# Patient Record
Sex: MALE | Race: WHITE | URBAN - METROPOLITAN AREA
[De-identification: names, ages, dates, MRNs, and addresses within clinical notes are randomized per-mention and may not be internally consistent; named-entity substitution may affect disease eponyms.]

---

## 2018-05-19 ENCOUNTER — OFFICE VISIT (OUTPATIENT)
Dept: FAMILY MEDICINE CLINIC | Facility: CLINIC | Age: 33
End: 2018-05-19

## 2018-05-19 VITALS
SYSTOLIC BLOOD PRESSURE: 110 MMHG | RESPIRATION RATE: 18 BRPM | WEIGHT: 176 LBS | BODY MASS INDEX: 24.1 KG/M2 | HEIGHT: 71.5 IN | OXYGEN SATURATION: 98 % | DIASTOLIC BLOOD PRESSURE: 70 MMHG | TEMPERATURE: 98 F | HEART RATE: 81 BPM

## 2018-05-19 DIAGNOSIS — J30.2 SEASONAL ALLERGIC RHINITIS, UNSPECIFIED TRIGGER: Primary | ICD-10-CM

## 2018-05-19 DIAGNOSIS — J02.9 SORE THROAT: ICD-10-CM

## 2018-05-19 PROCEDURE — 87880 STREP A ASSAY W/OPTIC: CPT | Performed by: NURSE PRACTITIONER

## 2018-05-19 PROCEDURE — 99213 OFFICE O/P EST LOW 20 MIN: CPT | Performed by: NURSE PRACTITIONER

## 2018-05-19 NOTE — PROGRESS NOTES
CHIEF COMPLAINT:   Patient presents with:  Sore Throat: ear pain, congestion, sinus pressure x 2 days      HPI:   Tom Grande is a 29year old male presents to clinic with symptoms of sore throat. Patient has had for 2 days.  Symptoms have been increasi CARDIO: RRR without murmur  GI: good BS's,no masses, hepatosplenomegaly, or tenderness on direct palpation  EXTREMITIES: no cyanosis, clubbing or edema  LYMPH: no anterior cervical. no submandibular lymphadenopathy.   No posterior cervical or occipital lymp Symptoms include a drippy, stuffy, and itchy nose. They also include sneezing and red and itchy eyes. You may feel tired more often.  Severe allergies may also affect your breathing and trigger a condition called asthma.   Tests can be done to see what dawson Call your healthcare provider right away if the following occur:  · Coughing or wheezing  · Fever of 100.4°F (38°C) or higher, or as directed by your healthcare provider  · Raised red bumps (hives)  · Continuing symptoms, new symptoms, or worsening symptom

## 2018-05-19 NOTE — PATIENT INSTRUCTIONS
Strep is negative  Use Claritin 10mg daily for sinus congestion  Ibuprofen as needed for pain. Allergic Rhinitis  Allergic rhinitis is an allergic reaction that affects the nose, and often the eyes. It’s often known as nasal allergies.  Nasal allergies ar · Keep humidity low by using a dehumidifier or air conditioner. Keep the dehumidifier and air conditioner clean and free of mold. · Clean moldy areas with bleach and water. In general:  · Vacuum once or twice a week.  If possible, use a vacuum with a high

## 2019-05-07 ENCOUNTER — OFFICE VISIT (OUTPATIENT)
Dept: FAMILY MEDICINE CLINIC | Facility: CLINIC | Age: 34
End: 2019-05-07
Payer: COMMERCIAL

## 2019-05-07 VITALS
HEIGHT: 71.5 IN | HEART RATE: 104 BPM | RESPIRATION RATE: 18 BRPM | WEIGHT: 176 LBS | DIASTOLIC BLOOD PRESSURE: 80 MMHG | TEMPERATURE: 99 F | BODY MASS INDEX: 24.1 KG/M2 | SYSTOLIC BLOOD PRESSURE: 110 MMHG | OXYGEN SATURATION: 98 %

## 2019-05-07 DIAGNOSIS — J01.00 ACUTE NON-RECURRENT MAXILLARY SINUSITIS: Primary | ICD-10-CM

## 2019-05-07 DIAGNOSIS — J30.89 SEASONAL ALLERGIC RHINITIS DUE TO OTHER ALLERGIC TRIGGER: ICD-10-CM

## 2019-05-07 DIAGNOSIS — R05.9 COUGH: ICD-10-CM

## 2019-05-07 PROCEDURE — 99213 OFFICE O/P EST LOW 20 MIN: CPT | Performed by: PHYSICIAN ASSISTANT

## 2019-05-07 RX ORDER — PREDNISONE 20 MG/1
20 TABLET ORAL 2 TIMES DAILY
Qty: 6 TABLET | Refills: 0 | Status: SHIPPED | OUTPATIENT
Start: 2019-05-07 | End: 2019-05-10

## 2019-05-07 RX ORDER — FLUTICASONE PROPIONATE 50 MCG
2 SPRAY, SUSPENSION (ML) NASAL DAILY
Qty: 1 BOTTLE | Refills: 1 | Status: SHIPPED | OUTPATIENT
Start: 2019-05-07 | End: 2020-05-01

## 2019-05-07 RX ORDER — BENZONATATE 200 MG/1
200 CAPSULE ORAL 3 TIMES DAILY PRN
Qty: 30 CAPSULE | Refills: 0 | Status: SHIPPED | OUTPATIENT
Start: 2019-05-07 | End: 2020-11-27 | Stop reason: ALTCHOICE

## 2019-05-07 RX ORDER — AMOXICILLIN AND CLAVULANATE POTASSIUM 875; 125 MG/1; MG/1
1 TABLET, FILM COATED ORAL 2 TIMES DAILY
Qty: 20 TABLET | Refills: 0 | Status: SHIPPED | OUTPATIENT
Start: 2019-05-07 | End: 2019-05-17

## 2019-05-07 NOTE — PATIENT INSTRUCTIONS
1. Augmentin 875 mg twice daily for 10 days. Recommend probiotics while on this medication to prevent antibiotics associated diarrhea or yeast infections.   Examples include yogurt with active live cultures; or in capsule/granule forms such as Florastor,

## 2019-05-07 NOTE — PROGRESS NOTES
CHIEF COMPLAINT:   Patient presents with:  Cough: congestion, sob x 3 weeks       HPI:   Shabnam Crockett is a 36year old male who presents for upper respiratory symptoms for  3 weeks.  Patient reports nasal congestion, sinus pressure, post nasal drainage, palpation of maxillary sinuses  EYES: conjunctiva clear, EOM intact  EARS: TM's clear bilaterally  NOSE: Nostrils patent, clear nasal discharge, nasal mucosa erythematous/edematous   THROAT: Oral mucosa pink, moist. Posterior pharynx is moderatly injected mg twice daily for 10 days. Recommend probiotics while on this medication to prevent antibiotics associated diarrhea or yeast infections.   Examples include yogurt with active live cultures; or in capsule/granule forms such as Florastor, Culturelle, Aligne

## 2020-01-16 ENCOUNTER — MED REC SCAN ONLY (OUTPATIENT)
Dept: FAMILY MEDICINE CLINIC | Facility: CLINIC | Age: 35
End: 2020-01-16

## 2020-02-27 ENCOUNTER — MED REC SCAN ONLY (OUTPATIENT)
Dept: FAMILY MEDICINE CLINIC | Facility: CLINIC | Age: 35
End: 2020-02-27

## 2020-11-27 ENCOUNTER — OFFICE VISIT (OUTPATIENT)
Dept: FAMILY MEDICINE CLINIC | Facility: CLINIC | Age: 35
End: 2020-11-27
Payer: COMMERCIAL

## 2020-11-27 VITALS
DIASTOLIC BLOOD PRESSURE: 84 MMHG | OXYGEN SATURATION: 96 % | TEMPERATURE: 97 F | WEIGHT: 175 LBS | RESPIRATION RATE: 16 BRPM | HEART RATE: 108 BPM | SYSTOLIC BLOOD PRESSURE: 121 MMHG | HEIGHT: 72 IN | BODY MASS INDEX: 23.7 KG/M2

## 2020-11-27 DIAGNOSIS — Z20.822 SUSPECTED COVID-19 VIRUS INFECTION: Primary | ICD-10-CM

## 2020-11-27 PROCEDURE — 3074F SYST BP LT 130 MM HG: CPT | Performed by: PHYSICIAN ASSISTANT

## 2020-11-27 PROCEDURE — 3079F DIAST BP 80-89 MM HG: CPT | Performed by: PHYSICIAN ASSISTANT

## 2020-11-27 PROCEDURE — 99213 OFFICE O/P EST LOW 20 MIN: CPT | Performed by: PHYSICIAN ASSISTANT

## 2020-11-27 PROCEDURE — 99072 ADDL SUPL MATRL&STAF TM PHE: CPT | Performed by: PHYSICIAN ASSISTANT

## 2020-11-27 PROCEDURE — 3008F BODY MASS INDEX DOCD: CPT | Performed by: PHYSICIAN ASSISTANT

## 2020-11-27 NOTE — PROGRESS NOTES
CHIEF COMPLAINT:     Patient presents with:  Diarrhea: body aches/congestion/fatigue/chills x 2 days. no fever      HPI:   George Grande is a 30year old male who presents with bodyache, fatigue, headache, runny nose for 2 days.   Has had a few loose stoo Diarrhea (body aches/congestion/fatigue/chills x 2 days. no fever). Symptoms are consistent with:      ASSESSMENT:  Suspected covid-19 virus infection  (primary encounter diagnosis)      PLAN: Sars CoV2 PCR. quest      Symptomatic care:   1.  Rest. Drink pl

## 2020-11-27 NOTE — PATIENT INSTRUCTIONS
Coronavirus Disease 2019 (COVID-19)     [place] [address] is committed to the safety and well-being of our patients, members, employees, and communities.  As concerns arise about the new strain of coronavirus that causes COVID-19, [place] [address] 4. If you have a medical appointment, call the healthcare provider ahead of time and tell them that you have or may have COVID-19.  5. For medical emergencies, call 911 and notify the dispatch personnel that you have or may have COVID-19.   6. Cover your c · At least 10 days have passed since symptoms first appeared OR if asymptomatic patient or date of symptom onset is unclear then use 10 days post COVID test date.    · At least 20 days have passed for severe illness (requiring hospitalization) OR if you are *Some people will be required to have a repeat COVID-19 test in order to be eligible to donate. If you’re instructed by Maria Antonia Valdez that a repeat test is required, please contact the [facility] COVID-19 Nurse Triage Line at [phone].     Additional Inf

## 2021-09-07 ENCOUNTER — OFFICE VISIT (OUTPATIENT)
Dept: FAMILY MEDICINE CLINIC | Facility: CLINIC | Age: 36
End: 2021-09-07
Payer: COMMERCIAL

## 2021-09-07 VITALS
HEIGHT: 72 IN | WEIGHT: 184 LBS | BODY MASS INDEX: 24.92 KG/M2 | DIASTOLIC BLOOD PRESSURE: 72 MMHG | HEART RATE: 79 BPM | SYSTOLIC BLOOD PRESSURE: 122 MMHG | TEMPERATURE: 98 F | OXYGEN SATURATION: 98 % | RESPIRATION RATE: 16 BRPM

## 2021-09-07 DIAGNOSIS — J06.9 UPPER RESPIRATORY TRACT INFECTION, UNSPECIFIED TYPE: Primary | ICD-10-CM

## 2021-09-07 PROCEDURE — 3078F DIAST BP <80 MM HG: CPT | Performed by: PHYSICIAN ASSISTANT

## 2021-09-07 PROCEDURE — 99213 OFFICE O/P EST LOW 20 MIN: CPT | Performed by: PHYSICIAN ASSISTANT

## 2021-09-07 PROCEDURE — 3074F SYST BP LT 130 MM HG: CPT | Performed by: PHYSICIAN ASSISTANT

## 2021-09-07 PROCEDURE — 3008F BODY MASS INDEX DOCD: CPT | Performed by: PHYSICIAN ASSISTANT

## 2021-09-07 RX ORDER — BENZONATATE 200 MG/1
200 CAPSULE ORAL 3 TIMES DAILY PRN
Qty: 30 CAPSULE | Refills: 0 | Status: SHIPPED | OUTPATIENT
Start: 2021-09-07

## 2021-09-07 RX ORDER — FLUTICASONE PROPIONATE 50 MCG
SPRAY, SUSPENSION (ML) NASAL
Qty: 1 EACH | Refills: 0 | Status: SHIPPED | OUTPATIENT
Start: 2021-09-07

## 2021-09-07 NOTE — PATIENT INSTRUCTIONS
Coronavirus Disease 2019 (COVID-19)     [facility] [facility] is committed to the safety and well-being of our patients, members, employees, and communities.  As concerns arise about the new strain of coronavirus that causes COVID-19, [address] exposure  • After day 7 from date of last exposure with a negative test result (test must occur on day 5 or later)  After stopping quarantine, you should  • Watch for symptoms until 14 days after exposure.   • If you have symptoms, immediately self-isolate Care     If you are awaiting test results or are confirmed positive for COVID -19, and your symptoms worsen at home with symptoms such as: extreme weakness, difficult breathing, or unrelenting fevers greater than 100.4 degrees Fahrenheit, you should contac Follow-up  If you are diagnosed with COVID, refrain from exercise until approved by your primary care provider. Please call your primary care provider within 2 days of your discharge to arrange for a telehealth follow-up.  CDC does not recommend repeat test Control & Prevention (CDC)  10 things you can do to manage your health at home, [email or web address]. pdf  [email or web address] Retrieved March 17, 2021, from [email or web address]-[email or web address]-long-haulers. html  Long-term effects of covid-19. (n.d.).  Retrieved May 11, 2021, from [email or web address] (water, soft drinks, juices, tea, or soup). Extra fluids will help loosen secretions in the nose and lungs.   · Over-the-counter cold medicines will not shorten the length of time you’re sick, but they may be helpful for the following symptoms: cough, sore

## 2021-09-07 NOTE — PROGRESS NOTES
CHIEF COMPLAINT:     Patient presents with:  Upper Respiratory Infection      HPI:   Sahil Olivo is a 29year old male who presents with cough, congestion, headache for 4 days.        Associated symptoms:    Fever/Chills  No  Sore throat  No  Cough  Yes diagnosis)      PLAN: Covid Test alinity pcr obtained. Symptomatic care:   1. Rest. Drink plenty of fluids. 2. Tylenol or ibuprofen for discomfort or fever.    3. OTC decongestant (phenylephrine) expectorants (guaifenesin), nasal steroid sprays  (flut

## 2021-09-09 LAB — SARS-COV-2 RNA RESP QL NAA+PROBE: NOT DETECTED

## 2023-02-06 ENCOUNTER — TELEPHONE (OUTPATIENT)
Dept: FAMILY MEDICINE CLINIC | Facility: CLINIC | Age: 38
End: 2023-02-06

## 2023-02-06 NOTE — TELEPHONE ENCOUNTER
PT HAS SCHEDULED MYCHART APT FOR COMP PX- PT HAS NOT BEEN SEEN SINCE 4/28/16. WILL DR STILL ACCEPT?     Future Appointments   Date Time Provider Theresa Workman   2/14/2023  2:00 PM Duane Burt MD [facility] BABAK Quinonez         PLEASE ADV    [facility] YOU

## 2023-02-14 ENCOUNTER — OFFICE VISIT (OUTPATIENT)
Dept: FAMILY MEDICINE CLINIC | Facility: CLINIC | Age: 38
End: 2023-02-14
Payer: COMMERCIAL

## 2023-02-14 VITALS
HEIGHT: 71.5 IN | SYSTOLIC BLOOD PRESSURE: 120 MMHG | HEART RATE: 77 BPM | WEIGHT: 169 LBS | OXYGEN SATURATION: 99 % | BODY MASS INDEX: 23.14 KG/M2 | TEMPERATURE: 97 F | RESPIRATION RATE: 16 BRPM | DIASTOLIC BLOOD PRESSURE: 80 MMHG

## 2023-02-14 DIAGNOSIS — Z30.09 VASECTOMY EVALUATION: ICD-10-CM

## 2023-02-14 DIAGNOSIS — Z00.00 WELL ADULT EXAM: Primary | ICD-10-CM

## 2023-02-14 DIAGNOSIS — R13.10 DYSPHAGIA, UNSPECIFIED TYPE: ICD-10-CM

## 2023-02-14 DIAGNOSIS — R53.83 OTHER FATIGUE: ICD-10-CM

## 2023-02-14 DIAGNOSIS — N52.9 ERECTILE DYSFUNCTION, UNSPECIFIED ERECTILE DYSFUNCTION TYPE: ICD-10-CM

## 2023-02-14 PROCEDURE — 99385 PREV VISIT NEW AGE 18-39: CPT | Performed by: FAMILY MEDICINE

## 2023-02-14 PROCEDURE — 3008F BODY MASS INDEX DOCD: CPT | Performed by: FAMILY MEDICINE

## 2023-02-14 PROCEDURE — 3074F SYST BP LT 130 MM HG: CPT | Performed by: FAMILY MEDICINE

## 2023-02-14 PROCEDURE — 3079F DIAST BP 80-89 MM HG: CPT | Performed by: FAMILY MEDICINE

## 2023-02-15 ENCOUNTER — NURSE ONLY (OUTPATIENT)
Dept: FAMILY MEDICINE CLINIC | Facility: CLINIC | Age: 38
End: 2023-02-15
Payer: COMMERCIAL

## 2023-02-15 DIAGNOSIS — R53.83 OTHER FATIGUE: ICD-10-CM

## 2023-02-15 DIAGNOSIS — Z00.00 WELL ADULT EXAM: ICD-10-CM

## 2023-02-15 DIAGNOSIS — N52.9 ERECTILE DYSFUNCTION, UNSPECIFIED ERECTILE DYSFUNCTION TYPE: ICD-10-CM

## 2023-02-15 LAB
ALBUMIN SERPL-MCNC: 4.3 G/DL (ref 3.4–5)
ALBUMIN/GLOB SERPL: 1.3 {RATIO} (ref 1–2)
ALP LIVER SERPL-CCNC: 76 U/L
ALT SERPL-CCNC: 32 U/L
ANION GAP SERPL CALC-SCNC: 0 MMOL/L (ref 0–18)
AST SERPL-CCNC: 19 U/L (ref 15–37)
BASOPHILS # BLD AUTO: 0.03 X10(3) UL (ref 0–0.2)
BASOPHILS NFR BLD AUTO: 0.6 %
BILIRUB SERPL-MCNC: 0.5 MG/DL (ref 0.1–2)
BUN BLD-MCNC: 10 MG/DL (ref 7–18)
CALCIUM BLD-MCNC: 9.5 MG/DL (ref 8.5–10.1)
CHLORIDE SERPL-SCNC: 106 MMOL/L (ref 98–112)
CHOLEST SERPL-MCNC: 165 MG/DL (ref ?–200)
CO2 SERPL-SCNC: 31 MMOL/L (ref 21–32)
CREAT BLD-MCNC: 0.86 MG/DL
EOSINOPHIL # BLD AUTO: 0.26 X10(3) UL (ref 0–0.7)
EOSINOPHIL NFR BLD AUTO: 5 %
ERYTHROCYTE [DISTWIDTH] IN BLOOD BY AUTOMATED COUNT: 11.8 %
FASTING PATIENT LIPID ANSWER: YES
FASTING STATUS PATIENT QL REPORTED: YES
GFR SERPLBLD BASED ON 1.73 SQ M-ARVRAT: 115 ML/MIN/1.73M2 (ref 60–?)
GLOBULIN PLAS-MCNC: 3.4 G/DL (ref 2.8–4.4)
GLUCOSE BLD-MCNC: 102 MG/DL (ref 70–99)
HCT VFR BLD AUTO: 43.8 %
HDLC SERPL-MCNC: 52 MG/DL (ref 40–59)
HGB BLD-MCNC: 15.1 G/DL
IMM GRANULOCYTES # BLD AUTO: 0 X10(3) UL (ref 0–1)
IMM GRANULOCYTES NFR BLD: 0 %
LDLC SERPL CALC-MCNC: 102 MG/DL (ref ?–100)
LYMPHOCYTES # BLD AUTO: 1.93 X10(3) UL (ref 1–4)
LYMPHOCYTES NFR BLD AUTO: 37.2 %
MCH RBC QN AUTO: 31 PG (ref 26–34)
MCHC RBC AUTO-ENTMCNC: 34.5 G/DL (ref 31–37)
MCV RBC AUTO: 89.9 FL
MONOCYTES # BLD AUTO: 0.29 X10(3) UL (ref 0.1–1)
MONOCYTES NFR BLD AUTO: 5.6 %
NEUTROPHILS # BLD AUTO: 2.68 X10 (3) UL (ref 1.5–7.7)
NEUTROPHILS # BLD AUTO: 2.68 X10(3) UL (ref 1.5–7.7)
NEUTROPHILS NFR BLD AUTO: 51.6 %
NONHDLC SERPL-MCNC: 113 MG/DL (ref ?–130)
OSMOLALITY SERPL CALC.SUM OF ELEC: 283 MOSM/KG (ref 275–295)
PLATELET # BLD AUTO: 326 10(3)UL (ref 150–450)
POTASSIUM SERPL-SCNC: 4.2 MMOL/L (ref 3.5–5.1)
PROT SERPL-MCNC: 7.7 G/DL (ref 6.4–8.2)
RBC # BLD AUTO: 4.87 X10(6)UL
SODIUM SERPL-SCNC: 137 MMOL/L (ref 136–145)
T4 FREE SERPL-MCNC: 1 NG/DL (ref 0.8–1.7)
TRIGL SERPL-MCNC: 52 MG/DL (ref 30–149)
TSI SER-ACNC: 1.67 MIU/ML (ref 0.36–3.74)
VLDLC SERPL CALC-MCNC: 9 MG/DL (ref 0–30)
WBC # BLD AUTO: 5.2 X10(3) UL (ref 4–11)

## 2023-02-15 PROCEDURE — 84402 ASSAY OF FREE TESTOSTERONE: CPT | Performed by: FAMILY MEDICINE

## 2023-02-15 PROCEDURE — 80061 LIPID PANEL: CPT | Performed by: FAMILY MEDICINE

## 2023-02-15 PROCEDURE — 80050 GENERAL HEALTH PANEL: CPT | Performed by: FAMILY MEDICINE

## 2023-02-15 PROCEDURE — 84403 ASSAY OF TOTAL TESTOSTERONE: CPT | Performed by: FAMILY MEDICINE

## 2023-02-15 PROCEDURE — 84439 ASSAY OF FREE THYROXINE: CPT | Performed by: FAMILY MEDICINE

## 2023-02-15 NOTE — PROGRESS NOTES
Kika Diaz present in office for nurse visit. Labs as ordered by Dr. Humphrey Pi; 22 g, Left AC, lavender tube, green tube and gold tube     All questions/concerns addressed. Patient left in stable condition.

## 2023-02-25 LAB
TESTOSTERONE TOTAL: 660.7 NG/DL
TESTOSTERONE, FREE -MS/MS: 129.7 PG/ML

## 2023-06-20 ENCOUNTER — OFFICE VISIT (OUTPATIENT)
Dept: SURGERY | Facility: CLINIC | Age: 38
End: 2023-06-20

## 2023-06-20 ENCOUNTER — PATIENT MESSAGE (OUTPATIENT)
Dept: SURGERY | Facility: CLINIC | Age: 38
End: 2023-06-20

## 2023-06-20 DIAGNOSIS — Z30.2 ENCOUNTER FOR STERILIZATION: Primary | ICD-10-CM

## 2023-06-20 PROCEDURE — 99243 OFF/OP CNSLTJ NEW/EST LOW 30: CPT | Performed by: SURGERY

## 2023-06-20 RX ORDER — DIAZEPAM 10 MG/1
10 TABLET ORAL SEE ADMIN INSTRUCTIONS
Qty: 1 TABLET | Refills: 0 | Status: SHIPPED | OUTPATIENT
Start: 2023-06-20

## 2023-06-20 NOTE — PROGRESS NOTES
Urology Clinic Note - New Patient    Referring Provider:  No referring provider defined for this encounter. Primary Care Provider:  Will Kitchen MD     Chief Complaint:   Referral for vasectomy    HPI:   Nayeli Calderón is a 41year old male with no PMH referred for interest in male sterility via vasectomy. He has 3 current children with his wife. He has thought about vasectomy for a while now and desires no further children. Blood thinners: No  Prior bleeding problems: No  Prior scrotal surgery: No    PSA:  No results found for: PSA, PERCENTPSA, PSAS, PSAULTRA     History:   No past medical history on file. No past surgical history on file. Family History   Problem Relation Age of Onset    Other (Lupus [Other]) Mother     Other (Scleroderma [Other]) Mother        Social History     Socioeconomic History    Marital status:    Tobacco Use    Smoking status: Never    Smokeless tobacco: Never   Substance and Sexual Activity    Alcohol use: Yes     Comment: social    Drug use: No       Medications (Active prior to today's visit):  Current Outpatient Medications   Medication Sig Dispense Refill    hyoscyamine 0.125 MG Sublingual SL Tab Place 1 tablet (125 mcg total) under the tongue every 6 (six) hours as needed (for esophageal spasm). 20 tablet 0       Allergies:  No Known Allergies    Review of Systems:   A comprehensive 10-point review of systems was completed. Pertinent positives and negatives are noted in the the HPI.     Physical Exam:   CONSTITUTIONAL: Well developed, well nourished, in no acute distress  NEUROLOGIC: Alert and oriented  HEAD: Normocephalic, atraumatic  EYES: Sclera non-icteric  ENT: Hearing intact, moist mucous membranes  NECK: No obvious goiter or masses  RESPIRATORY: Normal respiratory effort  SKIN: No evident rashes  ABDOMEN: Soft, non-tender, non-distended  GENITOURINARY: Normal phallus, orthotopic meatus, normal bilateral testicles, palpable vasa bilaterally    Assessment & Plan:   Julia Arora is a 41year old male referred for vasectomy consult. We discussed that a vasectomy is intended to be a permanent form of contraception and that if he desires fertility after vasectomy, options included vasectomy reversal and sperm retrieval with possible in vitro fertilization. These options are not always successful and may be very expensive. We also discussed the risks of vasectomy which include symptomatic hematoma and infection (1-2%), chronic scrotal pain (6%; caused by congestive epididymitis, sperm granuloma and/or infective epididymoorchitis), need for repeat vasectomy (<1% of cases), need for additional procedures, vasectomy failure, and unwanted pregnancy (1 in 2000 men). The chronic scrotal discomfort may be no more than a low-grade chronic ache that causes little disability and requires only symptomatic treatment. However, a small percentage of patients will be sufficiently debilitated to seek epididymectomy or even orchiectomy (removal of the epididymis and/or testicle). Furthermore, for a very small number of patients, even this radical surgery will not provide relief from their scrotal discomfort. We discussed that he will have two small incisions in his scrotum with dissolvable sutures. He was told that vasectomy does NOT produce immediate sterility and that following vasectomy, another form of contraception is required until vas occlusion is confirmed by post-vasectomy semen analysis. Post-vasectomy semen analysis will be obtained 10-12 weeks after the vasectomy. He was told that he may stop using other methods of contraception when examination of one well-mixed, uncentrifuged, fresh post-vasectomy semen specimen shows azoospermia or only rare non-motile sperm.  He was once again told that even after vas occlusion is confirmed, vasectomy is not 100% reliable in preventing pregnancy and that the risk of pregnancy after vasectomy is approximately 1 in 2,000 men who have post-vasectomy azoospermia or semen analysis showing rare non-motile sperm. The reasons are early recanalization of the vas deferens or the presence of an accessory vas unrecognized at the time of surgery. There have also been cases of DNA-confirmed paternity despite documented azoospermia before and after conception. He was told to refrain from ejaculation for approximately one week after vasectomy. I advised him to hold any blood thinners prior to this procedure, to trim the hair on the scrotum the day before the procedure, and to arrange for someone to drive him to and from the procedure if possible. He understands that for a few days after the procedure he will need to take it easy with minimal activity, ice the area, and keep the compression dressing/jock strap on. He understands and elects to proceed with vasectomy. - Book for vasectomy under local anesthesia in clinic with me, next available  - Hold NSAIDs or Aspirin for 7 days before procedure  - Valium 10mg PRN before procedure (informed must have ride home if taking)    Thank you for this consult. I have personally reviewed all relevant medical records, labs, and imaging. MDM  Number of Diagnoses or Management Options  Encounter for sterilization: new, no workup     Amount and/or Complexity of Data Reviewed  Review and summarize past medical records: yes    Risk of Complications, Morbidity, and/or Mortality  Management options: low  General comments: Minor procedure - vasectomy      Cabrera Abdi MD  Staff Urologist  [facility]  Office: [phone]

## 2023-06-23 ENCOUNTER — TELEPHONE (OUTPATIENT)
Dept: SURGERY | Facility: CLINIC | Age: 38
End: 2023-06-23

## 2023-06-26 NOTE — TELEPHONE ENCOUNTER
RN called patient and requested to move appt. No opening prior to 8/8. RN offered 8/18 instead and he agreed to plans.

## 2023-08-07 ENCOUNTER — TELEPHONE (OUTPATIENT)
Dept: SURGERY | Facility: CLINIC | Age: 38
End: 2023-08-07

## 2023-08-07 NOTE — TELEPHONE ENCOUNTER
LMVM advising pt that dr has been called in sx on 8/18 and appt needs to be rescheduled. Asked pt to call back to reschedule. Pt aware that appt has been cancelled.

## 2024-02-23 ENCOUNTER — PATIENT MESSAGE (OUTPATIENT)
Dept: FAMILY MEDICINE CLINIC | Facility: CLINIC | Age: 39
End: 2024-02-23

## 2024-02-23 RX ORDER — SILDENAFIL 100 MG/1
TABLET, FILM COATED ORAL
Qty: 30 TABLET | Refills: 0 | Status: SHIPPED | OUTPATIENT
Start: 2024-02-23

## 2024-02-23 NOTE — TELEPHONE ENCOUNTER
From: Bear Pete  To: Cristhian Pedro  Sent: 2/23/2024 6:57 AM CST  Subject: Pursuing viagra     Good Morning Dr. Pedro. Last visit I was in we talked about getting viagra to help in certain times. You had mentioned we could add that later and that we could look at what pharmacy had the best price as this wouldn't be included in my prescription drug coverage. Can you put an order in for this? And where would I look to fond the best price and pharmacy to use?

## 2024-09-16 RX ORDER — SILDENAFIL 100 MG/1
TABLET, FILM COATED ORAL
Qty: 30 TABLET | Refills: 0 | Status: SHIPPED | OUTPATIENT
Start: 2024-09-16

## 2024-09-16 NOTE — TELEPHONE ENCOUNTER
Please let patient or caregiver know or leave message that refill request for the medication/s listed below has/have come to our office. The refills have been sent.  It appears the patient is due for a yearly physical and fasting labs. Please help schedule this in the next month or so.  Thanks       Requested Prescriptions     Signed Prescriptions Disp Refills    Sildenafil Citrate (VIAGRA) 100 MG Oral Tab 30 tablet 0     Sig: Take 1/4 to 1 pill daily as needed     Authorizing Provider: KEVIN GAMBOA

## 2024-12-03 ENCOUNTER — OFFICE VISIT (OUTPATIENT)
Dept: FAMILY MEDICINE CLINIC | Facility: CLINIC | Age: 39
End: 2024-12-03
Payer: COMMERCIAL

## 2024-12-03 VITALS
SYSTOLIC BLOOD PRESSURE: 106 MMHG | OXYGEN SATURATION: 98 % | RESPIRATION RATE: 16 BRPM | TEMPERATURE: 98 F | HEIGHT: 72 IN | HEART RATE: 88 BPM | BODY MASS INDEX: 25.47 KG/M2 | DIASTOLIC BLOOD PRESSURE: 76 MMHG | WEIGHT: 188 LBS

## 2024-12-03 DIAGNOSIS — R05.1 ACUTE COUGH: ICD-10-CM

## 2024-12-03 DIAGNOSIS — J01.00 ACUTE NON-RECURRENT MAXILLARY SINUSITIS: Primary | ICD-10-CM

## 2024-12-03 PROCEDURE — 99213 OFFICE O/P EST LOW 20 MIN: CPT | Performed by: NURSE PRACTITIONER

## 2024-12-03 NOTE — PROGRESS NOTES
CHIEF COMPLAINT:     Chief Complaint   Patient presents with    Cough     Sinus congestion/pressure       HPI:   Bear Pete is a 39 year old male who presents for concerns of a sinus infection. Patient reports sinus congestion/pressure, cough, chills and body aches for the past week.  Denies any fevers, wheezing, chest discomfort, or shortness of breath. .  Treating symptoms with otc cold meds.   Tolerates PO well at home. No n/v/d.  Denies any other aggravating or relieving factors at home. Denies any other treatment attempts prior to arrival.   Denies known covid-19 or strep exposure.     Current Outpatient Medications   Medication Sig Dispense Refill    amoxicillin clavulanate 875-125 MG Oral Tab Take 1 tablet by mouth 2 (two) times daily for 10 days. 20 tablet 0    Sildenafil Citrate (VIAGRA) 100 MG Oral Tab Take 1/4 to 1 pill daily as needed 30 tablet 0    hyoscyamine 0.125 MG Sublingual SL Tab Place 1 tablet (125 mcg total) under the tongue every 6 (six) hours as needed (for esophageal spasm). 20 tablet 0    diazePAM (VALIUM) 10 MG Oral Tab Take 1 tablet (10 mg total) by mouth See Admin Instructions. Take 1 tablet by mouth 20-30 minutes before procedure. (Patient not taking: Reported on 12/3/2024) 1 tablet 0      No past medical history on file.   No past surgical history on file.      Social History     Socioeconomic History    Marital status:    Tobacco Use    Smoking status: Never    Smokeless tobacco: Never   Substance and Sexual Activity    Alcohol use: Yes     Comment: social    Drug use: No     Social Drivers of Health      Received from [facility], [facility]    Social Connections    Received from [facility], [facility]    Housing Stability         REVIEW OF SYSTEMS:   GENERAL: Denies fever. Notes good appetite  SKIN: no rashes or abnormal skin lesions  HEENT: Denies sore throat, + sinus symptoms, Denies ear  pain  LUNGS: + nonproductive cough, denies shortness of breath or wheezing,   CARDIOVASCULAR: denies chest pain or palpitations   GI: denies N/V/C or abdominal pain  NEURO: Denies headaches    EXAM:   /76   Pulse 88   Temp 98.4 °F (36.9 °C)   Resp 16   Ht 6' (1.829 m)   Wt 188 lb (85.3 kg)   SpO2 98%   BMI 25.50 kg/m²   GENERAL: well developed, well nourished,in no apparent distress  SKIN: no rashes,no suspicious lesions  HEAD: atraumatic, normocephalic.    EYES: conjunctiva clear  EARS: TM's intact and without erythema, no bulging, no retraction,no fluid, bony landmarks visualized. No erythema or swelling noted to ear canals or external ears.   NOSE: Nostrils patent, dried yellow nasal mucous, nasal mucosa reddened   THROAT: Oral mucosa pink, moist. Posterior pharynx is mildly erythematous. No exudates. No tonsillar hypertrophy noted.  No trismus. Uvula midline with no swelling. Voice clear/normal. No stridor  NECK: Supple, non-tender  LUNGS: clear to auscultation bilaterally, no rales, wheezes or rhonchi. Breathing is non labored.  CARDIO: RRR without murmur  EXTREMITIES: no cyanosis, clubbing or edema  LYMPH:  No lymphadenopathy.        ASSESSMENT AND PLAN:       ICD-10-CM    1. Acute non-recurrent maxillary sinusitis  J01.00 amoxicillin clavulanate 875-125 MG Oral Tab      2. Acute cough  R05.1         Discussed physical exam and hpi with pt. Pt has reassuring physical exam consistent with sinusitis. We discussed viral vs allergy vs bacterial etiologies associated with sinusitis. Pt agreeable to delayed antimicrobial therapy option. Treatment options discussed with patient and explained in detail. We reviewed symptomatic care at home and if no improvement then will start augmentin as prescribed. . The risks, benefits and potential side effects of possible medications were reviewed. Alternatives were discussed. Monitoring parameters and expected course outlined. Patient to call PCP or go to emergency  department if symptoms fail to respond as outlined, or worsen in any way. The patient agreed with the plan.       Patient Instructions   1. Rest. Drink plenty of fluids.     2. Supportive care as discussed.     3. If no improvement in 48hrs then start augmentin as prescribed.    4. Follow up with PMD in 4-5 days for re-eval. Go to the emergency department immediately if symptoms worsen, change, you develop chest discomfort, wheezing, shortness of breath, or if you have any concerns.

## 2024-12-03 NOTE — PATIENT INSTRUCTIONS
1. Rest. Drink plenty of fluids.     2. Supportive care as discussed.     3. If no improvement in 48hrs then start augmentin as prescribed.    4. Follow up with PMD in 4-5 days for re-eval. Go to the emergency department immediately if symptoms worsen, change, you develop chest discomfort, wheezing, shortness of breath, or if you have any concerns.

## 2025-01-15 RX ORDER — SILDENAFIL 100 MG/1
TABLET, FILM COATED ORAL
Qty: 30 TABLET | Refills: 0 | Status: SHIPPED | OUTPATIENT
Start: 2025-01-15

## 2025-01-15 NOTE — TELEPHONE ENCOUNTER
Please let patient or caregiver know or leave message that refill request for the medication/s listed below has/have come to our office. The refills have been sent.    It appears the patient is due for a yearly physical and fasting labs. He has not been seen in nearly 2 years. Please help schedule this in the next month or so.  Thanks      Requested Prescriptions     Signed Prescriptions Disp Refills    hyoscyamine 0.125 MG Sublingual SL Tab 20 tablet 0     Sig: Place 1 tablet (125 mcg total) under the tongue every 6 (six) hours as needed (for esophageal spasm).     Authorizing Provider: KEVIN GAMBOA    Sildenafil Citrate (VIAGRA) 100 MG Oral Tab 30 tablet 0     Sig: Take 1/4 to 1 pill daily as needed     Authorizing Provider: KEVIN GAMBOA

## 2025-01-21 ENCOUNTER — OFFICE VISIT (OUTPATIENT)
Dept: FAMILY MEDICINE CLINIC | Facility: CLINIC | Age: 40
End: 2025-01-21
Payer: COMMERCIAL

## 2025-01-21 VITALS
HEART RATE: 76 BPM | TEMPERATURE: 98 F | OXYGEN SATURATION: 99 % | WEIGHT: 189.38 LBS | SYSTOLIC BLOOD PRESSURE: 104 MMHG | HEIGHT: 72 IN | DIASTOLIC BLOOD PRESSURE: 70 MMHG | BODY MASS INDEX: 25.65 KG/M2

## 2025-01-21 DIAGNOSIS — Z00.00 WELL ADULT EXAM: Primary | ICD-10-CM

## 2025-01-21 LAB
ALT SERPL-CCNC: 40 U/L
ANION GAP SERPL CALC-SCNC: 5 MMOL/L (ref 0–18)
AST SERPL-CCNC: 23 U/L (ref ?–34)
BUN BLD-MCNC: 8 MG/DL (ref 9–23)
CALCIUM BLD-MCNC: 9.8 MG/DL (ref 8.7–10.6)
CHLORIDE SERPL-SCNC: 104 MMOL/L (ref 98–112)
CHOLEST SERPL-MCNC: 157 MG/DL (ref ?–200)
CO2 SERPL-SCNC: 28 MMOL/L (ref 21–32)
CREAT BLD-MCNC: 0.9 MG/DL
EGFRCR SERPLBLD CKD-EPI 2021: 112 ML/MIN/1.73M2 (ref 60–?)
ERYTHROCYTE [DISTWIDTH] IN BLOOD BY AUTOMATED COUNT: 11.5 %
FASTING PATIENT LIPID ANSWER: YES
FASTING STATUS PATIENT QL REPORTED: YES
GLUCOSE BLD-MCNC: 92 MG/DL (ref 70–99)
HCT VFR BLD AUTO: 42.6 %
HDLC SERPL-MCNC: 37 MG/DL (ref 40–59)
HGB BLD-MCNC: 14.8 G/DL
LDLC SERPL CALC-MCNC: 100 MG/DL (ref ?–100)
MCH RBC QN AUTO: 31.4 PG (ref 26–34)
MCHC RBC AUTO-ENTMCNC: 34.7 G/DL (ref 31–37)
MCV RBC AUTO: 90.3 FL
NONHDLC SERPL-MCNC: 120 MG/DL (ref ?–130)
OSMOLALITY SERPL CALC.SUM OF ELEC: 282 MOSM/KG (ref 275–295)
PLATELET # BLD AUTO: 331 10(3)UL (ref 150–450)
POTASSIUM SERPL-SCNC: 4.5 MMOL/L (ref 3.5–5.1)
RBC # BLD AUTO: 4.72 X10(6)UL
SODIUM SERPL-SCNC: 137 MMOL/L (ref 136–145)
TRIGL SERPL-MCNC: 110 MG/DL (ref 30–149)
TSI SER-ACNC: 1.76 UIU/ML (ref 0.55–4.78)
VLDLC SERPL CALC-MCNC: 18 MG/DL (ref 0–30)
WBC # BLD AUTO: 4.8 X10(3) UL (ref 4–11)

## 2025-01-21 PROCEDURE — 84460 ALANINE AMINO (ALT) (SGPT): CPT | Performed by: FAMILY MEDICINE

## 2025-01-21 PROCEDURE — 84450 TRANSFERASE (AST) (SGOT): CPT | Performed by: FAMILY MEDICINE

## 2025-01-21 PROCEDURE — 80061 LIPID PANEL: CPT | Performed by: FAMILY MEDICINE

## 2025-01-21 PROCEDURE — 84443 ASSAY THYROID STIM HORMONE: CPT | Performed by: FAMILY MEDICINE

## 2025-01-21 PROCEDURE — 80048 BASIC METABOLIC PNL TOTAL CA: CPT | Performed by: FAMILY MEDICINE

## 2025-01-21 PROCEDURE — 85027 COMPLETE CBC AUTOMATED: CPT | Performed by: FAMILY MEDICINE

## 2025-01-21 NOTE — PROGRESS NOTES
Bear Pete is a 39 year old male.    CC:    Chief Complaint   Patient presents with    Physical     Reviewed Preventative/Wellness form with patient.         HPI:  Yearly PX    Last PSA: No results found for this or any previous visit (from the past [id number] hours).     Last lipid:  Lab Results   Component Value Date    CHOLEST 165 02/15/2023    TRIG 52 02/15/2023    HDL 52 02/15/2023     (H) 02/15/2023    VLDL 9 02/15/2023    NONHDLC 113 02/15/2023       Last Colon Cancer screening: na      Immunization History   Administered Date(s) Administered    None   Pended Date(s) Pended    TDAP 01/21/2025       There is no problem list on file for this patient.       Allergies as of 01/21/2025    (No Known Allergies)        Current Meds:  Current Outpatient Medications   Medication Sig Dispense Refill    hyoscyamine 0.125 MG Sublingual SL Tab Place 1 tablet (125 mcg total) under the tongue every 6 (six) hours as needed (for esophageal spasm). 20 tablet 0    Sildenafil Citrate (VIAGRA) 100 MG Oral Tab Take 1/4 to 1 pill daily as needed 30 tablet 0        History:  History reviewed. No pertinent past medical history.   History reviewed. No pertinent surgical history.   Family History   Problem Relation Age of Onset    Other (Lupus [Other]) Mother     Other (Scleroderma [Other]) Mother       Family Status   Relation Status    Mo Alive    Fa Alive      Social History     Socioeconomic History    Marital status:    Tobacco Use    Smoking status: Never    Smokeless tobacco: Current     Types: Chew   Substance and Sexual Activity    Alcohol use: Yes     Comment: social    Drug use: No     Social Drivers of Health      Received from [facility], [facility]    Social Connections    Received from [facility] [place], [facility]    Housing Stability        ROS:  General: energy level stable      Vitals: /70   Pulse 76   Temp 98.1 °F (36.7  °C) (Temporal)   Ht 6' (1.829 m)   Wt 189 lb 6.4 oz (85.9 kg)   SpO2 99%   BMI 25.69 kg/m²    Reviewed by ANGELA Pedro M.D.    Physical Exam:  GEN: well developed, well nourished, in no apparent distress  EYE: B conjunctiva and lids normal  HENT: normocephalic; normal nose, pharynx and TM's  NECK: No lymphadenopathy, thyromegaly or masses  CAR: S1, S2 normal, RRR; no S3, no S4; no click; murmur negative  PULM: clear to auscultation B, no accessory muscle use  GI: normal active BS+, soft, nondistended; no HSM; no masses; no bruits; no masses; nontender, no G/R/R   PSYCH: alert and oriented x 3; affect appropriate  SKIN: not examined  EXTREMITIES: No clubbing, cyanosis or edema  GENITAL: not examined  LYMPH: no supraclavicular nodes  NEURO: Awake and alert. Normal speech and articulation. No facial droop or asymmetry. Moving all extremities equally. Symmetric B patellar DTRs    ASSESSMENT AND PLAN    1. Well adult exam  Tdap  Encouraged chewing tob cessation as it can increase the risk of oral and throat cancers  Await results   - VENIPUNCTURE  - ALT(SGPT); Future  - AST (SGOT); Future  - Basic Metabolic Panel (8); Future  - CBC, Platelet; No Differential; Future  - Lipid Panel; Future  - TSH W Reflex To Free T4; Future      No follow-ups on file.    Orders for this visit:    Orders Placed This Encounter   Procedures    ALT(SGPT)     Standing Status:   Future     Number of Occurrences:   1     Standing Expiration Date:   1/21/2026    AST (SGOT)     Standing Status:   Future     Number of Occurrences:   1     Standing Expiration Date:   1/21/2026    Basic Metabolic Panel (8)     Standing Status:   Future     Number of Occurrences:   1     Standing Expiration Date:   1/21/2026    CBC, Platelet; No Differential     Standing Status:   Future     Number of Occurrences:   1     Standing Expiration Date:   1/21/2026    Lipid Panel     Standing Status:   Future     Number of Occurrences:   1     Standing Expiration Date:    1/21/2026    TSH W Reflex To Free T4     Standing Status:   Future     Number of Occurrences:   1     Standing Expiration Date:   1/21/2026    TdaP (Adacel, Boostrix) [19911]    VENIPUNCTURE     Order Specific Question:   Release to patient     Answer:   Immediate       TETANUS, DIPHTHERIA TOXOIDS AND ACELLULAR PERTUSIS VACCINE (TDAP), >7 YEARS, IM USE    Meds & Refills for this Visit:  Requested Prescriptions      No prescriptions requested or ordered in this encounter             Authorized by Cristhian Pedro M.D.